# Patient Record
Sex: FEMALE | Race: BLACK OR AFRICAN AMERICAN | NOT HISPANIC OR LATINO | URBAN - METROPOLITAN AREA
[De-identification: names, ages, dates, MRNs, and addresses within clinical notes are randomized per-mention and may not be internally consistent; named-entity substitution may affect disease eponyms.]

---

## 2017-11-25 ENCOUNTER — INPATIENT (INPATIENT)
Facility: HOSPITAL | Age: 62
LOS: 2 days | Discharge: HOME | End: 2017-11-28
Attending: SURGERY | Admitting: SURGERY
Payer: COMMERCIAL

## 2017-11-25 VITALS
HEART RATE: 90 BPM | RESPIRATION RATE: 16 BRPM | OXYGEN SATURATION: 99 % | TEMPERATURE: 97 F | DIASTOLIC BLOOD PRESSURE: 73 MMHG | SYSTOLIC BLOOD PRESSURE: 157 MMHG

## 2017-11-25 DIAGNOSIS — K57.92 DIVERTICULITIS OF INTESTINE, PART UNSPECIFIED, WITHOUT PERFORATION OR ABSCESS WITHOUT BLEEDING: ICD-10-CM

## 2017-11-25 DIAGNOSIS — Z98.84 BARIATRIC SURGERY STATUS: Chronic | ICD-10-CM

## 2017-11-25 LAB
ALBUMIN SERPL ELPH-MCNC: 3.8 G/DL — SIGNIFICANT CHANGE UP (ref 3.3–5)
ALP SERPL-CCNC: 142 U/L — HIGH (ref 40–120)
ALT FLD-CCNC: 21 U/L — SIGNIFICANT CHANGE UP (ref 4–33)
APPEARANCE UR: SIGNIFICANT CHANGE UP
APTT BLD: 31.2 SEC — SIGNIFICANT CHANGE UP (ref 27.5–37.4)
AST SERPL-CCNC: 22 U/L — SIGNIFICANT CHANGE UP (ref 4–32)
BASOPHILS # BLD AUTO: 0.04 K/UL — SIGNIFICANT CHANGE UP (ref 0–0.2)
BASOPHILS NFR BLD AUTO: 0.3 % — SIGNIFICANT CHANGE UP (ref 0–2)
BILIRUB SERPL-MCNC: 0.8 MG/DL — SIGNIFICANT CHANGE UP (ref 0.2–1.2)
BILIRUB UR-MCNC: SIGNIFICANT CHANGE UP
BLD GP AB SCN SERPL QL: NEGATIVE — SIGNIFICANT CHANGE UP
BLOOD UR QL VISUAL: NEGATIVE — SIGNIFICANT CHANGE UP
BUN SERPL-MCNC: 16 MG/DL — SIGNIFICANT CHANGE UP (ref 7–23)
CALCIUM SERPL-MCNC: 9.3 MG/DL — SIGNIFICANT CHANGE UP (ref 8.4–10.5)
CHLORIDE SERPL-SCNC: 101 MMOL/L — SIGNIFICANT CHANGE UP (ref 98–107)
CO2 SERPL-SCNC: 24 MMOL/L — SIGNIFICANT CHANGE UP (ref 22–31)
COLOR SPEC: YELLOW — SIGNIFICANT CHANGE UP
CREAT SERPL-MCNC: 0.9 MG/DL — SIGNIFICANT CHANGE UP (ref 0.5–1.3)
EOSINOPHIL # BLD AUTO: 0.13 K/UL — SIGNIFICANT CHANGE UP (ref 0–0.5)
EOSINOPHIL NFR BLD AUTO: 1 % — SIGNIFICANT CHANGE UP (ref 0–6)
GLUCOSE SERPL-MCNC: 127 MG/DL — HIGH (ref 70–99)
GLUCOSE UR-MCNC: NEGATIVE — SIGNIFICANT CHANGE UP
HBA1C BLD-MCNC: 6.7 % — HIGH (ref 4–5.6)
HCT VFR BLD CALC: 36.3 % — SIGNIFICANT CHANGE UP (ref 34.5–45)
HGB BLD-MCNC: 11.3 G/DL — LOW (ref 11.5–15.5)
IMM GRANULOCYTES # BLD AUTO: 0.09 # — SIGNIFICANT CHANGE UP
IMM GRANULOCYTES NFR BLD AUTO: 0.7 % — SIGNIFICANT CHANGE UP (ref 0–1.5)
INR BLD: 1.26 — HIGH (ref 0.88–1.17)
KETONES UR-MCNC: SIGNIFICANT CHANGE UP
LEUKOCYTE ESTERASE UR-ACNC: HIGH
LYMPHOCYTES # BLD AUTO: 1.94 K/UL — SIGNIFICANT CHANGE UP (ref 1–3.3)
LYMPHOCYTES # BLD AUTO: 14.5 % — SIGNIFICANT CHANGE UP (ref 13–44)
MCHC RBC-ENTMCNC: 28.5 PG — SIGNIFICANT CHANGE UP (ref 27–34)
MCHC RBC-ENTMCNC: 31.1 % — LOW (ref 32–36)
MCV RBC AUTO: 91.4 FL — SIGNIFICANT CHANGE UP (ref 80–100)
MONOCYTES # BLD AUTO: 1.2 K/UL — HIGH (ref 0–0.9)
MONOCYTES NFR BLD AUTO: 8.9 % — SIGNIFICANT CHANGE UP (ref 2–14)
MUCOUS THREADS # UR AUTO: SIGNIFICANT CHANGE UP
NEUTROPHILS # BLD AUTO: 10.02 K/UL — HIGH (ref 1.8–7.4)
NEUTROPHILS NFR BLD AUTO: 74.6 % — SIGNIFICANT CHANGE UP (ref 43–77)
NITRITE UR-MCNC: NEGATIVE — SIGNIFICANT CHANGE UP
NRBC # FLD: 0 — SIGNIFICANT CHANGE UP
PH UR: 6.5 — SIGNIFICANT CHANGE UP (ref 4.6–8)
PLATELET # BLD AUTO: 279 K/UL — SIGNIFICANT CHANGE UP (ref 150–400)
PMV BLD: 11 FL — SIGNIFICANT CHANGE UP (ref 7–13)
POTASSIUM SERPL-MCNC: 3.3 MMOL/L — LOW (ref 3.5–5.3)
POTASSIUM SERPL-SCNC: 3.3 MMOL/L — LOW (ref 3.5–5.3)
PROT SERPL-MCNC: 7.4 G/DL — SIGNIFICANT CHANGE UP (ref 6–8.3)
PROT UR-MCNC: 100 — HIGH
PROTHROM AB SERPL-ACNC: 14.2 SEC — HIGH (ref 9.8–13.1)
RBC # BLD: 3.97 M/UL — SIGNIFICANT CHANGE UP (ref 3.8–5.2)
RBC # FLD: 12.8 % — SIGNIFICANT CHANGE UP (ref 10.3–14.5)
RBC CASTS # UR COMP ASSIST: SIGNIFICANT CHANGE UP (ref 0–?)
RH IG SCN BLD-IMP: POSITIVE — SIGNIFICANT CHANGE UP
SODIUM SERPL-SCNC: 143 MMOL/L — SIGNIFICANT CHANGE UP (ref 135–145)
SP GR SPEC: 1.03 — SIGNIFICANT CHANGE UP (ref 1–1.03)
SQUAMOUS # UR AUTO: SIGNIFICANT CHANGE UP
TSH SERPL-MCNC: 5.21 UIU/ML — HIGH (ref 0.27–4.2)
UROBILINOGEN FLD QL: >8 E.U. — SIGNIFICANT CHANGE UP (ref 0.1–0.2)
WBC # BLD: 13.42 K/UL — HIGH (ref 3.8–10.5)
WBC # FLD AUTO: 13.42 K/UL — HIGH (ref 3.8–10.5)
WBC UR QL: HIGH (ref 0–?)

## 2017-11-25 PROCEDURE — 74177 CT ABD & PELVIS W/CONTRAST: CPT | Mod: 26

## 2017-11-25 PROCEDURE — 99222 1ST HOSP IP/OBS MODERATE 55: CPT

## 2017-11-25 RX ORDER — SODIUM CHLORIDE 9 MG/ML
1000 INJECTION, SOLUTION INTRAVENOUS
Qty: 0 | Refills: 0 | Status: DISCONTINUED | OUTPATIENT
Start: 2017-11-25 | End: 2017-11-26

## 2017-11-25 RX ORDER — VALSARTAN 80 MG/1
320 TABLET ORAL DAILY
Qty: 0 | Refills: 0 | Status: DISCONTINUED | OUTPATIENT
Start: 2017-11-25 | End: 2017-11-28

## 2017-11-25 RX ORDER — MONTELUKAST 4 MG/1
1 TABLET, CHEWABLE ORAL
Qty: 0 | Refills: 0 | COMMUNITY

## 2017-11-25 RX ORDER — DILTIAZEM HCL 120 MG
240 CAPSULE, EXT RELEASE 24 HR ORAL DAILY
Qty: 0 | Refills: 0 | Status: DISCONTINUED | OUTPATIENT
Start: 2017-11-25 | End: 2017-11-28

## 2017-11-25 RX ORDER — ASPIRIN/CALCIUM CARB/MAGNESIUM 324 MG
81 TABLET ORAL DAILY
Qty: 0 | Refills: 0 | Status: DISCONTINUED | OUTPATIENT
Start: 2017-11-25 | End: 2017-11-28

## 2017-11-25 RX ORDER — PIPERACILLIN AND TAZOBACTAM 4; .5 G/20ML; G/20ML
3.38 INJECTION, POWDER, LYOPHILIZED, FOR SOLUTION INTRAVENOUS ONCE
Qty: 0 | Refills: 0 | Status: COMPLETED | OUTPATIENT
Start: 2017-11-25 | End: 2017-11-25

## 2017-11-25 RX ORDER — ENOXAPARIN SODIUM 100 MG/ML
40 INJECTION SUBCUTANEOUS EVERY 24 HOURS
Qty: 0 | Refills: 0 | Status: DISCONTINUED | OUTPATIENT
Start: 2017-11-25 | End: 2017-11-28

## 2017-11-25 RX ORDER — INSULIN LISPRO 100/ML
VIAL (ML) SUBCUTANEOUS EVERY 6 HOURS
Qty: 0 | Refills: 0 | Status: DISCONTINUED | OUTPATIENT
Start: 2017-11-25 | End: 2017-11-28

## 2017-11-25 RX ORDER — METFORMIN HYDROCHLORIDE 850 MG/1
1 TABLET ORAL
Qty: 0 | Refills: 0 | COMMUNITY

## 2017-11-25 RX ORDER — BUDESONIDE AND FORMOTEROL FUMARATE DIHYDRATE 160; 4.5 UG/1; UG/1
2 AEROSOL RESPIRATORY (INHALATION)
Qty: 0 | Refills: 0 | Status: DISCONTINUED | OUTPATIENT
Start: 2017-11-25 | End: 2017-11-28

## 2017-11-25 RX ORDER — TRAMADOL HYDROCHLORIDE 50 MG/1
1 TABLET ORAL
Qty: 0 | Refills: 0 | COMMUNITY

## 2017-11-25 RX ORDER — PIPERACILLIN AND TAZOBACTAM 4; .5 G/20ML; G/20ML
3.38 INJECTION, POWDER, LYOPHILIZED, FOR SOLUTION INTRAVENOUS EVERY 8 HOURS
Qty: 0 | Refills: 0 | Status: DISCONTINUED | OUTPATIENT
Start: 2017-11-25 | End: 2017-11-28

## 2017-11-25 RX ORDER — ALBUTEROL 90 UG/1
2 AEROSOL, METERED ORAL EVERY 6 HOURS
Qty: 0 | Refills: 0 | Status: DISCONTINUED | OUTPATIENT
Start: 2017-11-25 | End: 2017-11-28

## 2017-11-25 RX ORDER — DILTIAZEM HCL 120 MG
1 CAPSULE, EXT RELEASE 24 HR ORAL
Qty: 0 | Refills: 0 | COMMUNITY

## 2017-11-25 RX ORDER — ASPIRIN/CALCIUM CARB/MAGNESIUM 324 MG
1 TABLET ORAL
Qty: 0 | Refills: 0 | COMMUNITY

## 2017-11-25 RX ORDER — MONTELUKAST 4 MG/1
10 TABLET, CHEWABLE ORAL DAILY
Qty: 0 | Refills: 0 | Status: DISCONTINUED | OUTPATIENT
Start: 2017-11-25 | End: 2017-11-28

## 2017-11-25 RX ORDER — SODIUM CHLORIDE 9 MG/ML
1000 INJECTION INTRAMUSCULAR; INTRAVENOUS; SUBCUTANEOUS ONCE
Qty: 0 | Refills: 0 | Status: COMPLETED | OUTPATIENT
Start: 2017-11-25 | End: 2017-11-25

## 2017-11-25 RX ORDER — FLUTICASONE PROPIONATE AND SALMETEROL 50; 250 UG/1; UG/1
2 POWDER ORAL; RESPIRATORY (INHALATION)
Qty: 0 | Refills: 0 | COMMUNITY

## 2017-11-25 RX ORDER — ALBUTEROL 90 UG/1
3 AEROSOL, METERED ORAL
Qty: 0 | Refills: 0 | COMMUNITY

## 2017-11-25 RX ORDER — LEVOTHYROXINE SODIUM 125 MCG
1 TABLET ORAL
Qty: 0 | Refills: 0 | COMMUNITY

## 2017-11-25 RX ORDER — LEVOTHYROXINE SODIUM 125 MCG
75 TABLET ORAL DAILY
Qty: 0 | Refills: 0 | Status: DISCONTINUED | OUTPATIENT
Start: 2017-11-25 | End: 2017-11-28

## 2017-11-25 RX ADMIN — PIPERACILLIN AND TAZOBACTAM 200 GRAM(S): 4; .5 INJECTION, POWDER, LYOPHILIZED, FOR SOLUTION INTRAVENOUS at 20:59

## 2017-11-25 RX ADMIN — SODIUM CHLORIDE 1000 MILLILITER(S): 9 INJECTION INTRAMUSCULAR; INTRAVENOUS; SUBCUTANEOUS at 17:33

## 2017-11-25 NOTE — H&P ADULT - NSHPPHYSICALEXAM_GEN_ALL_CORE
T(C): 36.5 (11-25-17 @ 21:02), Max: 36.5 (11-25-17 @ 21:02)  HR: 80 (11-25-17 @ 21:02) (80 - 90)  BP: 129/58 (11-25-17 @ 21:02) (129/58 - 157/73)  RR: 18 (11-25-17 @ 21:02) (16 - 18)  SpO2: 97% (11-25-17 @ 21:02) (97% - 99%)  POCT Blood Glucose.: 101 mg/dL (25 Nov 2017 15:25)    General: NAD, Lying in bed  Neuro: A+Ox3, no focal deficits  HEENT: NC/AT, EOMI  Cardio: RRR  Resp: Good effort bilaterally  GI/Abd: Soft, obese, non-distended, tender in LLQ with focal guarding, no masses palpated  Vascular: All 4 extremities warm  Skin: Intact, no breakdown  Musculoskeletal: All 4 extremities moving spontaneously

## 2017-11-25 NOTE — ED PROVIDER NOTE - MEDICAL DECISION MAKING DETAILS
62 y.o female pmhx of DM , HTN, HLD, hypothyroidism, urinary incontinence, L side pain x 2 years, Lap band procedure 6 years ago no complications, here with increased urinary frequency and urge to urinate and L flank pain radiating to L groin since Wednesday. will obtain labs, ct abdomen and pelvis for LLQ tenderness, ua/ucx, denying pain medication at this time.

## 2017-11-25 NOTE — ED PROVIDER NOTE - PROGRESS NOTE DETAILS
darwin english: spoke to radiology, localized perforated sigmoid diverticulotics , thickening of appendix ? early appy- pt stable, discussed with patient and paged surgery darwin english: spoke to radiology, localized perforated sigmoid diverticulotics , thickening of appendix ? early appy- pt stable, discussed with patient and paged surgery. pt still denying any pain medication. stable darwin english: spoke to surgery- since pt had lap band needs to see bariatric surgeon Dr. Cunningham, awaiting call back , pt will need to be transferred, discussed with patient, agreeable to plan. darwin english: spoke to surgery, pt does not have to be transferred and can be admitted to [facility]

## 2017-11-25 NOTE — ED PROVIDER NOTE - OBJECTIVE STATEMENT
62 y.o female pmhx of DM , HTN, HLD, hypothyroidism, urinary incontinence, L side pain x 2 years, Lap band procedure 6 years ago no complications, seen in ED prior for same complaint coming in with increased urinary frequency and urge to urinate and L flank pain radiating to L groin since Wednesday.  Pain is worse with movement and palpation.  Admits to increased incontinence for the past 2 years increasing size of diaper/pads - was told by GYN to see a urogyn but has not made an appt yet. Has not tried anything for the pain. Denies fevers, chills, headache, chest pain, SOB, cough, n/v/d, numbness, tingling, weakness, hematuria.

## 2017-11-25 NOTE — ED ADULT NURSE NOTE - OBJECTIVE STATEMENT
pt c.o. urinary incontinence since wed. states she had a strange feeling in her vaginal area. denies fevers, dysuria. states she thinks she has a prolapse in her bladder. sl placed labs sent. ns infusing. awaiting ct.

## 2017-11-25 NOTE — ED PROVIDER NOTE - PMH
Asthma    Asthma    Diabetes    DM (Diabetes Mellitus)    Heart Murmur    Hypercholesterolemia    Hypertension    Hypertension    Hypothyroid    Hypothyroidism    Lumbar Disc Disorder    Obese    CLARI (Obstructive Sleep Apnea)

## 2017-11-25 NOTE — H&P ADULT - NSHPLABSRESULTS_GEN_ALL_CORE
CBC (11-25 @ 16:59)                              11.3<L>                         13.42<H>  )----------------(  279        74.6  % Neutrophils, 14.5  % Lymphocytes, ANC: 10.02<H>                              36.3      BMP (11-25 @ 16:59)             143     |  101     |  16    		Ca++ --      Ca 9.3                ---------------------------------( 127<H>		Mg --                 3.3<L>  |  24      |  0.90  			Ph --        LFTs (11-25 @ 16:59)      TPro 7.4 / Alb 3.8 / TBili 0.8 / DBili -- / AST 22 / ALT 21 / AlkPhos 142<H>    Coags (11-25 @ 20:45)  aPTT 31.2 / INR 1.26<H> / PT 14.2<H>      ---------------------------------------------------------------------------------------------      IMAGING    < from: CT Abdomen and Pelvis w/ Oral Cont and w/ IV Cont (11.25.17 @ 19:24) >    IMPRESSION:     Acute sigmoid diverticulitis complicated by localized extraluminal free   air. Extensive inflammatory change in the suprapubic region and reactive   thickening of small bowel loops suggests localized peritonitis. No   discrete focal drainable collection.     The appendix appears dilated and fluid-filled, indeterminate.  The   appendix was normal on a previous CT of 2015.  Correlate clinically for   early acute appendicitis.     Indeterminate 4.1 cm exophytic liver mass as described above, similar   appearance to November 2015. Favor hemangioma as it has not grown only in   the past 2 years. Neoplasm cannot be excluded and MRI may be obtained for   further evaluation if clinically indicated.    < end of copied text >

## 2017-11-25 NOTE — ED ADULT TRIAGE NOTE - CHIEF COMPLAINT QUOTE
pt c/o bladder pain and urinary incontinence since Wednesday, states that urine became dark today.  PMH: asthma, hypothyroid, HTN, DM

## 2017-11-25 NOTE — H&P ADULT - ASSESSMENT
ASSESSMENT: Patient is a 62y old f with acute sigmoid diverticulitis, with surrounding inflammation without abscess.      PLAN:   - Admit to general surgery, Dr. Azul  - No acute surgical intervention at this time  - NPO  - IV Fluids  - IV Antibiotics, c/w Zosyn  - c/w Home Meds  - Serial Abdominal Exams.   - Patient and plan discussed with Attending, Dr. Azul.     Kannan Sanchez MD PGY3  B Team Surgery, #[id number]

## 2017-11-25 NOTE — ED PROVIDER NOTE - ATTENDING CONTRIBUTION TO CARE
Magali núñez- 61 y/o F with h/o dm, baseline mild urine incontinence, asthma, obesity, htn p/w left sdied abd pain and feels than something sinked into her pelvis and has increased pain when she has urge to urinate, No fever, chills,  nausea, vomiting, diarrhea or constipation. Pt has no prior h/o kidney stones. Pt lives with  at home     pt is alert, well appearing obese female, s1s2 normal reg, b/l clear breath sounds, abd soft obese noted ventral hernia and tenderness to palpation in llq , normal bowel sounds, no cvat b/l, pelvic exam showed no prolapse on inspection, changes on thighs from chronic urine incontinence,- skin darkening noted, skin warm dry, good turgor    likely neurogenic blader, from diabetes, will r/o uti , will check labs, also ct abd to r/o maliganancy vs kidney stone vs colitis

## 2017-11-25 NOTE — H&P ADULT - HISTORY OF PRESENT ILLNESS
Patient is a 62y old Female who presents with a chief complaint of lower abdominal pain.  Patient states that since Wednesday, she has had severe lower abdominal pain, that has been worse when she moves.  She has had mild abdominal pain intermittently for the past 2 years, but this current episode was much more severe and it felt slightly deeper than her chronic pain.  Her pain is worst on the left lower abdomen.  She was initially able to cool on Thursday but did not eat anything, she has had very limited PO intake since Wednesday.  She has never had previous episodes of pain like this.  Her pain is tolerable when she lays down and doesn't move.  She had chills when her symptoms started on Wednesday, but denies fevers.  She denies lightheadedness/dizziness, denies SOB/chest pain, denies nausea/vomiting, denies constipation/diarrhea.  Her last BM was yesterday morning.      Past surgical history significant for a lap band in 2011.  Patient has not had bariatric surgery follow up in many years, and has not lost weight since the surgery.  Her last colonoscopy was also in 2011, and per the patient, was reportedly normal.

## 2017-11-25 NOTE — ED PROVIDER NOTE - INPATIENT RESIDENT/ACP NOTIFIED DATE
Alprazolam called to [facility] as authorized.  Patient was notified that Dr. Schilling wants to keep her on this dose instead of going back up.  Verbalizes understanding.   25-Nov-2017 21:45

## 2017-11-25 NOTE — H&P ADULT - ATTENDING COMMENTS
Patient presents with acute diverticulitis with localized perforation     a.  Admit to surgery/anand byers  b.  Nil per os  c.  Continue IVF resuscitation  d.  Agree with IV zosyn  e.  CT reviewed

## 2017-11-26 LAB
BUN SERPL-MCNC: 13 MG/DL — SIGNIFICANT CHANGE UP (ref 7–23)
CALCIUM SERPL-MCNC: 8.7 MG/DL — SIGNIFICANT CHANGE UP (ref 8.4–10.5)
CHLORIDE SERPL-SCNC: 104 MMOL/L — SIGNIFICANT CHANGE UP (ref 98–107)
CO2 SERPL-SCNC: 24 MMOL/L — SIGNIFICANT CHANGE UP (ref 22–31)
CREAT SERPL-MCNC: 0.77 MG/DL — SIGNIFICANT CHANGE UP (ref 0.5–1.3)
GLUCOSE SERPL-MCNC: 100 MG/DL — HIGH (ref 70–99)
HCT VFR BLD CALC: 30.7 % — LOW (ref 34.5–45)
HGB BLD-MCNC: 9.9 G/DL — LOW (ref 11.5–15.5)
MAGNESIUM SERPL-MCNC: 1.9 MG/DL — SIGNIFICANT CHANGE UP (ref 1.6–2.6)
MCHC RBC-ENTMCNC: 28.8 PG — SIGNIFICANT CHANGE UP (ref 27–34)
MCHC RBC-ENTMCNC: 32.2 % — SIGNIFICANT CHANGE UP (ref 32–36)
MCV RBC AUTO: 89.2 FL — SIGNIFICANT CHANGE UP (ref 80–100)
NRBC # FLD: 0 — SIGNIFICANT CHANGE UP
PHOSPHATE SERPL-MCNC: 3.1 MG/DL — SIGNIFICANT CHANGE UP (ref 2.5–4.5)
PLATELET # BLD AUTO: 256 K/UL — SIGNIFICANT CHANGE UP (ref 150–400)
PMV BLD: 10.4 FL — SIGNIFICANT CHANGE UP (ref 7–13)
POTASSIUM SERPL-MCNC: 3.1 MMOL/L — LOW (ref 3.5–5.3)
POTASSIUM SERPL-SCNC: 3.1 MMOL/L — LOW (ref 3.5–5.3)
RBC # BLD: 3.44 M/UL — LOW (ref 3.8–5.2)
RBC # FLD: 13 % — SIGNIFICANT CHANGE UP (ref 10.3–14.5)
SODIUM SERPL-SCNC: 144 MMOL/L — SIGNIFICANT CHANGE UP (ref 135–145)
WBC # BLD: 10.83 K/UL — HIGH (ref 3.8–10.5)
WBC # FLD AUTO: 10.83 K/UL — HIGH (ref 3.8–10.5)

## 2017-11-26 PROCEDURE — 99233 SBSQ HOSP IP/OBS HIGH 50: CPT

## 2017-11-26 RX ORDER — ACETAMINOPHEN 500 MG
1000 TABLET ORAL ONCE
Qty: 0 | Refills: 0 | Status: COMPLETED | OUTPATIENT
Start: 2017-11-26 | End: 2017-11-26

## 2017-11-26 RX ORDER — SODIUM CHLORIDE 9 MG/ML
1000 INJECTION INTRAMUSCULAR; INTRAVENOUS; SUBCUTANEOUS
Qty: 0 | Refills: 0 | Status: DISCONTINUED | OUTPATIENT
Start: 2017-11-26 | End: 2017-11-26

## 2017-11-26 RX ORDER — POTASSIUM CHLORIDE 20 MEQ
10 PACKET (EA) ORAL
Qty: 0 | Refills: 0 | Status: COMPLETED | OUTPATIENT
Start: 2017-11-26 | End: 2017-11-26

## 2017-11-26 RX ORDER — DEXTROSE MONOHYDRATE, SODIUM CHLORIDE, AND POTASSIUM CHLORIDE 50; .745; 4.5 G/1000ML; G/1000ML; G/1000ML
1000 INJECTION, SOLUTION INTRAVENOUS
Qty: 0 | Refills: 0 | Status: DISCONTINUED | OUTPATIENT
Start: 2017-11-26 | End: 2017-11-28

## 2017-11-26 RX ORDER — SODIUM CHLORIDE 9 MG/ML
1000 INJECTION INTRAMUSCULAR; INTRAVENOUS; SUBCUTANEOUS ONCE
Qty: 0 | Refills: 0 | Status: COMPLETED | OUTPATIENT
Start: 2017-11-26 | End: 2017-11-26

## 2017-11-26 RX ADMIN — SODIUM CHLORIDE 1000 MILLILITER(S): 9 INJECTION INTRAMUSCULAR; INTRAVENOUS; SUBCUTANEOUS at 21:50

## 2017-11-26 RX ADMIN — Medication 400 MILLIGRAM(S): at 17:10

## 2017-11-26 RX ADMIN — Medication 75 MICROGRAM(S): at 05:40

## 2017-11-26 RX ADMIN — BUDESONIDE AND FORMOTEROL FUMARATE DIHYDRATE 2 PUFF(S): 160; 4.5 AEROSOL RESPIRATORY (INHALATION) at 21:50

## 2017-11-26 RX ADMIN — VALSARTAN 320 MILLIGRAM(S): 80 TABLET ORAL at 05:41

## 2017-11-26 RX ADMIN — Medication 100 MILLIEQUIVALENT(S): at 21:49

## 2017-11-26 RX ADMIN — DEXTROSE MONOHYDRATE, SODIUM CHLORIDE, AND POTASSIUM CHLORIDE 150 MILLILITER(S): 50; .745; 4.5 INJECTION, SOLUTION INTRAVENOUS at 15:54

## 2017-11-26 RX ADMIN — ENOXAPARIN SODIUM 40 MILLIGRAM(S): 100 INJECTION SUBCUTANEOUS at 05:40

## 2017-11-26 RX ADMIN — SODIUM CHLORIDE 150 MILLILITER(S): 9 INJECTION INTRAMUSCULAR; INTRAVENOUS; SUBCUTANEOUS at 05:40

## 2017-11-26 RX ADMIN — Medication 81 MILLIGRAM(S): at 13:44

## 2017-11-26 RX ADMIN — BUDESONIDE AND FORMOTEROL FUMARATE DIHYDRATE 2 PUFF(S): 160; 4.5 AEROSOL RESPIRATORY (INHALATION) at 10:37

## 2017-11-26 RX ADMIN — PIPERACILLIN AND TAZOBACTAM 25 GRAM(S): 4; .5 INJECTION, POWDER, LYOPHILIZED, FOR SOLUTION INTRAVENOUS at 05:40

## 2017-11-26 RX ADMIN — Medication 100 MILLIEQUIVALENT(S): at 23:51

## 2017-11-26 RX ADMIN — PIPERACILLIN AND TAZOBACTAM 25 GRAM(S): 4; .5 INJECTION, POWDER, LYOPHILIZED, FOR SOLUTION INTRAVENOUS at 15:54

## 2017-11-26 RX ADMIN — Medication 1000 MILLIGRAM(S): at 17:35

## 2017-11-26 RX ADMIN — MONTELUKAST 10 MILLIGRAM(S): 4 TABLET, CHEWABLE ORAL at 13:44

## 2017-11-26 RX ADMIN — Medication 240 MILLIGRAM(S): at 05:41

## 2017-11-26 NOTE — PROGRESS NOTE ADULT - ATTENDING COMMENTS
Patient seen and examined case discussed  with b team residents at bedside.  Chart and note reviewed    Patient with LLQ pain and tenderness. She denies loose or bloody  bowel movements.    Acute diverticulitis  a. NPO  b.  continue ivf and IV zosyn  c.  Bowel rest  d.  DVT prophylaxis with lovenox  e.  Discuss diagnosis, management and prognosis of her current condition

## 2017-11-27 LAB
BACTERIA UR CULT: SIGNIFICANT CHANGE UP
BUN SERPL-MCNC: 10 MG/DL — SIGNIFICANT CHANGE UP (ref 7–23)
CALCIUM SERPL-MCNC: 8.5 MG/DL — SIGNIFICANT CHANGE UP (ref 8.4–10.5)
CHLORIDE SERPL-SCNC: 103 MMOL/L — SIGNIFICANT CHANGE UP (ref 98–107)
CO2 SERPL-SCNC: 24 MMOL/L — SIGNIFICANT CHANGE UP (ref 22–31)
CREAT SERPL-MCNC: 0.74 MG/DL — SIGNIFICANT CHANGE UP (ref 0.5–1.3)
GLUCOSE SERPL-MCNC: 155 MG/DL — HIGH (ref 70–99)
HCT VFR BLD CALC: 32.3 % — LOW (ref 34.5–45)
HGB BLD-MCNC: 10.1 G/DL — LOW (ref 11.5–15.5)
MAGNESIUM SERPL-MCNC: 1.8 MG/DL — SIGNIFICANT CHANGE UP (ref 1.6–2.6)
MCHC RBC-ENTMCNC: 28.5 PG — SIGNIFICANT CHANGE UP (ref 27–34)
MCHC RBC-ENTMCNC: 31.3 % — LOW (ref 32–36)
MCV RBC AUTO: 91.2 FL — SIGNIFICANT CHANGE UP (ref 80–100)
NRBC # FLD: 0 — SIGNIFICANT CHANGE UP
PHOSPHATE SERPL-MCNC: 2.7 MG/DL — SIGNIFICANT CHANGE UP (ref 2.5–4.5)
PLATELET # BLD AUTO: 279 K/UL — SIGNIFICANT CHANGE UP (ref 150–400)
PMV BLD: 10.3 FL — SIGNIFICANT CHANGE UP (ref 7–13)
POTASSIUM SERPL-MCNC: 3.7 MMOL/L — SIGNIFICANT CHANGE UP (ref 3.5–5.3)
POTASSIUM SERPL-SCNC: 3.7 MMOL/L — SIGNIFICANT CHANGE UP (ref 3.5–5.3)
RBC # BLD: 3.54 M/UL — LOW (ref 3.8–5.2)
RBC # FLD: 12.8 % — SIGNIFICANT CHANGE UP (ref 10.3–14.5)
SODIUM SERPL-SCNC: 140 MMOL/L — SIGNIFICANT CHANGE UP (ref 135–145)
SPECIMEN SOURCE: SIGNIFICANT CHANGE UP
WBC # BLD: 7.77 K/UL — SIGNIFICANT CHANGE UP (ref 3.8–10.5)
WBC # FLD AUTO: 7.77 K/UL — SIGNIFICANT CHANGE UP (ref 3.8–10.5)

## 2017-11-27 RX ORDER — ACETAMINOPHEN 500 MG
650 TABLET ORAL EVERY 6 HOURS
Qty: 0 | Refills: 0 | Status: DISCONTINUED | OUTPATIENT
Start: 2017-11-27 | End: 2017-11-28

## 2017-11-27 RX ORDER — POTASSIUM PHOSPHATE, MONOBASIC POTASSIUM PHOSPHATE, DIBASIC 236; 224 MG/ML; MG/ML
15 INJECTION, SOLUTION INTRAVENOUS ONCE
Qty: 0 | Refills: 0 | Status: COMPLETED | OUTPATIENT
Start: 2017-11-27 | End: 2017-11-27

## 2017-11-27 RX ORDER — MAGNESIUM SULFATE 500 MG/ML
2 VIAL (ML) INJECTION ONCE
Qty: 0 | Refills: 0 | Status: COMPLETED | OUTPATIENT
Start: 2017-11-27 | End: 2017-11-27

## 2017-11-27 RX ADMIN — Medication 50 GRAM(S): at 10:24

## 2017-11-27 RX ADMIN — PIPERACILLIN AND TAZOBACTAM 25 GRAM(S): 4; .5 INJECTION, POWDER, LYOPHILIZED, FOR SOLUTION INTRAVENOUS at 10:35

## 2017-11-27 RX ADMIN — Medication 75 MICROGRAM(S): at 05:20

## 2017-11-27 RX ADMIN — Medication 650 MILLIGRAM(S): at 10:24

## 2017-11-27 RX ADMIN — DEXTROSE MONOHYDRATE, SODIUM CHLORIDE, AND POTASSIUM CHLORIDE 150 MILLILITER(S): 50; .745; 4.5 INJECTION, SOLUTION INTRAVENOUS at 10:24

## 2017-11-27 RX ADMIN — Medication 100 MILLIEQUIVALENT(S): at 01:10

## 2017-11-27 RX ADMIN — PIPERACILLIN AND TAZOBACTAM 25 GRAM(S): 4; .5 INJECTION, POWDER, LYOPHILIZED, FOR SOLUTION INTRAVENOUS at 17:23

## 2017-11-27 RX ADMIN — Medication 2: at 06:16

## 2017-11-27 RX ADMIN — POTASSIUM PHOSPHATE, MONOBASIC POTASSIUM PHOSPHATE, DIBASIC 62.5 MILLIMOLE(S): 236; 224 INJECTION, SOLUTION INTRAVENOUS at 12:18

## 2017-11-27 RX ADMIN — DEXTROSE MONOHYDRATE, SODIUM CHLORIDE, AND POTASSIUM CHLORIDE 150 MILLILITER(S): 50; .745; 4.5 INJECTION, SOLUTION INTRAVENOUS at 05:20

## 2017-11-27 RX ADMIN — PIPERACILLIN AND TAZOBACTAM 25 GRAM(S): 4; .5 INJECTION, POWDER, LYOPHILIZED, FOR SOLUTION INTRAVENOUS at 02:36

## 2017-11-27 RX ADMIN — BUDESONIDE AND FORMOTEROL FUMARATE DIHYDRATE 2 PUFF(S): 160; 4.5 AEROSOL RESPIRATORY (INHALATION) at 10:24

## 2017-11-27 RX ADMIN — Medication 650 MILLIGRAM(S): at 17:49

## 2017-11-27 RX ADMIN — MONTELUKAST 10 MILLIGRAM(S): 4 TABLET, CHEWABLE ORAL at 12:18

## 2017-11-27 RX ADMIN — Medication 81 MILLIGRAM(S): at 12:18

## 2017-11-27 RX ADMIN — VALSARTAN 320 MILLIGRAM(S): 80 TABLET ORAL at 05:20

## 2017-11-27 RX ADMIN — ENOXAPARIN SODIUM 40 MILLIGRAM(S): 100 INJECTION SUBCUTANEOUS at 05:20

## 2017-11-27 RX ADMIN — DEXTROSE MONOHYDRATE, SODIUM CHLORIDE, AND POTASSIUM CHLORIDE 75 MILLILITER(S): 50; .745; 4.5 INJECTION, SOLUTION INTRAVENOUS at 17:23

## 2017-11-27 NOTE — PROGRESS NOTE ADULT - SUBJECTIVE AND OBJECTIVE BOX
B Team (General Surgery) Daily Progress Note    SUBJECTIVE:  Pt seen and examined, and is resting comfortably in bed. No acute events overnight. Pain is adequately controlled on current regimen. Pt has no complaints at this time. Negative for flatus and BM.     OBJECTIVE:  Vital Signs Last 24 Hrs  T(C): 37.1 (27 Nov 2017 05:18), Max: 37.1 (27 Nov 2017 05:18)  T(F): 98.8 (27 Nov 2017 05:18), Max: 98.8 (27 Nov 2017 05:18)  HR: 64 (27 Nov 2017 05:18) (64 - 72)  BP: 139/67 (27 Nov 2017 05:18) (118/55 - 139/67)  BP(mean): --  RR: 17 (27 Nov 2017 05:18) (17 - 19)  SpO2: 99% (27 Nov 2017 05:18) (97% - 100%)    I&O's Detail    26 Nov 2017 07:01  -  27 Nov 2017 07:00  --------------------------------------------------------  IN:    dextrose 5% + sodium chloride 0.45% with potassium chloride 20 mEq/L: 1200 mL    IV PiggyBack: 400 mL  Total IN: 1600 mL    OUT:    Voided: 1275 mL  Total OUT: 1275 mL    Total NET: 325 mL      Exam:  GEN: A&Ox3, NAD  HEENT: atraumatic, normocephalic  CV: no JVD  RESP: no increased work of breathing, no use of accessory muscles  GI/ABD: soft, appropriately tender in LLQ, non-distended, no rebound or guarding  : deferred  EXTREMITIES: warm, pink, well-perfused                        10.1   7.77  )-----------( 279      ( 27 Nov 2017 06:15 )             32.3       11-27    140  |  103  |  10  ----------------------------<  155<H>  3.7   |  24  |  0.74    Ca    8.5      27 Nov 2017 06:15  Phos  2.7     11-27  Mg     1.8     11-27    TPro  7.4  /  Alb  3.8  /  TBili  0.8  /  DBili  x   /  AST  22  /  ALT  21  /  AlkPhos  142<H>  11-25      PT/INR - ( 25 Nov 2017 20:45 )   PT: 14.2 SEC;   INR: 1.26          PTT - ( 25 Nov 2017 20:45 )  PTT:31.2 SEC    ASSESSMENT: 62yFemale with acute sigmoid diverticulitis no abscess    PLAN:    - Diet: NPO/IVF  - Continue Zosyn  - Monitor GI function  - Activity: OOB/ambulation  - Incentive spirometry  - DVT prophylaxis  - Dispo: admitted to B-team surgery        Zhou Shah MD PGY-1  pager: [phone]

## 2017-11-27 NOTE — PROGRESS NOTE ADULT - ATTENDING COMMENTS
I have personally interviewed and examined this patient, reviewed pertinent labs and imaging, and discussed the case with colleagues, residents, and physician assistants on B Team rounds.    The active care issues are:  1. acute diverticulitis  2. at risk for malnutrition  3. obesity    Plan  trial of clear liquid diet  continue antibiotics  ambulate  incentive spirometry

## 2017-11-28 ENCOUNTER — TRANSCRIPTION ENCOUNTER (OUTPATIENT)
Age: 62
End: 2017-11-28

## 2017-11-28 VITALS
RESPIRATION RATE: 20 BRPM | OXYGEN SATURATION: 94 % | DIASTOLIC BLOOD PRESSURE: 72 MMHG | SYSTOLIC BLOOD PRESSURE: 148 MMHG | HEART RATE: 82 BPM | TEMPERATURE: 98 F

## 2017-11-28 LAB
BUN SERPL-MCNC: 7 MG/DL — SIGNIFICANT CHANGE UP (ref 7–23)
CALCIUM SERPL-MCNC: 8.7 MG/DL — SIGNIFICANT CHANGE UP (ref 8.4–10.5)
CHLORIDE SERPL-SCNC: 106 MMOL/L — SIGNIFICANT CHANGE UP (ref 98–107)
CO2 SERPL-SCNC: 23 MMOL/L — SIGNIFICANT CHANGE UP (ref 22–31)
CREAT SERPL-MCNC: 0.8 MG/DL — SIGNIFICANT CHANGE UP (ref 0.5–1.3)
GLUCOSE SERPL-MCNC: 123 MG/DL — HIGH (ref 70–99)
MAGNESIUM SERPL-MCNC: 1.8 MG/DL — SIGNIFICANT CHANGE UP (ref 1.6–2.6)
PHOSPHATE SERPL-MCNC: 2.7 MG/DL — SIGNIFICANT CHANGE UP (ref 2.5–4.5)
POTASSIUM SERPL-MCNC: 3.8 MMOL/L — SIGNIFICANT CHANGE UP (ref 3.5–5.3)
POTASSIUM SERPL-SCNC: 3.8 MMOL/L — SIGNIFICANT CHANGE UP (ref 3.5–5.3)
SODIUM SERPL-SCNC: 144 MMOL/L — SIGNIFICANT CHANGE UP (ref 135–145)

## 2017-11-28 RX ORDER — INSULIN LISPRO 100/ML
VIAL (ML) SUBCUTANEOUS
Qty: 0 | Refills: 0 | Status: DISCONTINUED | OUTPATIENT
Start: 2017-11-28 | End: 2017-11-28

## 2017-11-28 RX ORDER — ACETAMINOPHEN 500 MG
2 TABLET ORAL
Qty: 0 | Refills: 0 | COMMUNITY
Start: 2017-11-28

## 2017-11-28 RX ORDER — POTASSIUM CHLORIDE 20 MEQ
20 PACKET (EA) ORAL ONCE
Qty: 0 | Refills: 0 | Status: COMPLETED | OUTPATIENT
Start: 2017-11-28 | End: 2017-11-28

## 2017-11-28 RX ADMIN — Medication 81 MILLIGRAM(S): at 11:08

## 2017-11-28 RX ADMIN — Medication 20 MILLIEQUIVALENT(S): at 11:08

## 2017-11-28 RX ADMIN — MONTELUKAST 10 MILLIGRAM(S): 4 TABLET, CHEWABLE ORAL at 11:08

## 2017-11-28 RX ADMIN — Medication 650 MILLIGRAM(S): at 17:39

## 2017-11-28 RX ADMIN — BUDESONIDE AND FORMOTEROL FUMARATE DIHYDRATE 2 PUFF(S): 160; 4.5 AEROSOL RESPIRATORY (INHALATION) at 11:08

## 2017-11-28 RX ADMIN — PIPERACILLIN AND TAZOBACTAM 25 GRAM(S): 4; .5 INJECTION, POWDER, LYOPHILIZED, FOR SOLUTION INTRAVENOUS at 11:08

## 2017-11-28 RX ADMIN — PIPERACILLIN AND TAZOBACTAM 25 GRAM(S): 4; .5 INJECTION, POWDER, LYOPHILIZED, FOR SOLUTION INTRAVENOUS at 01:00

## 2017-11-28 RX ADMIN — VALSARTAN 320 MILLIGRAM(S): 80 TABLET ORAL at 05:45

## 2017-11-28 RX ADMIN — ENOXAPARIN SODIUM 40 MILLIGRAM(S): 100 INJECTION SUBCUTANEOUS at 05:45

## 2017-11-28 RX ADMIN — Medication 240 MILLIGRAM(S): at 05:45

## 2017-11-28 RX ADMIN — DEXTROSE MONOHYDRATE, SODIUM CHLORIDE, AND POTASSIUM CHLORIDE 75 MILLILITER(S): 50; .745; 4.5 INJECTION, SOLUTION INTRAVENOUS at 05:45

## 2017-11-28 RX ADMIN — Medication 75 MICROGRAM(S): at 05:45

## 2017-11-28 RX ADMIN — DEXTROSE MONOHYDRATE, SODIUM CHLORIDE, AND POTASSIUM CHLORIDE 30 MILLILITER(S): 50; .745; 4.5 INJECTION, SOLUTION INTRAVENOUS at 11:41

## 2017-11-28 RX ADMIN — Medication 650 MILLIGRAM(S): at 05:54

## 2017-11-28 NOTE — DISCHARGE NOTE ADULT - PLAN OF CARE
Recovery from diverticulitis flare ACTIVITY: No heavy lifting or straining. Otherwise, you may return to your usual level of physical activity.   DIET: You may resume your regular diet.  NOTIFY YOUR SURGEON IF: You have a fever (over 100.4 F) or chills, persistent nausea/vomiting, persistent diarrhea, or if your pain is not controlled on your discharge pain medications.  FOLLOW-UP: Please follow up with your primary care physician in week regarding your hospitalization.

## 2017-11-28 NOTE — DISCHARGE NOTE ADULT - MEDICATION SUMMARY - MEDICATIONS TO TAKE
I will START or STAY ON the medications listed below when I get home from the hospital:    traMADol 50 mg oral tablet  -- 1 tab(s) by mouth once a day, As Needed - for moderate pain  -- Indication: For Moderate to severe pain    aspirin 81 mg oral tablet  -- 1 tab(s) by mouth once a day  -- Indication: For CASD    acetaminophen 325 mg oral tablet  -- 2 tab(s) by mouth every 6 hours, As needed, Mild to moderate pain  -- Indication: For Mild pain    Cardizem  mg/24 hours oral capsule, extended release  -- 1 cap(s) by mouth once a day  -- Indication: For HTN    metFORMIN 500 mg oral tablet  -- 1 tab(s) by mouth 2 times a day  -- Indication: For Diabetes    Diovan  mg-25 mg oral tablet  -- 1 tab(s) by mouth once a day  -- Indication: For HTN    Advair  mcg-21 mcg/inh inhalation aerosol  -- 2 puff(s) inhaled 2 times a day  -- Indication: For Asthma    albuterol 2.5 mg/3 mL (0.083%) inhalation solution  -- 3 milliliter(s) inhaled every 6 hours, As Needed  -- Indication: For Asthma    Singulair 10 mg oral tablet  -- 1 tab(s) by mouth once a day  -- Indication: For Asthma/allergies    Synthroid 75 mcg (0.075 mg) oral tablet  -- 1 tab(s) by mouth once a day  -- Indication: For Hypothyroidism I will START or STAY ON the medications listed below when I get home from the hospital:    traMADol 50 mg oral tablet  -- 1 tab(s) by mouth once a day, As Needed - for moderate pain  -- Indication: For MODERATE TO SEVERE PAIN    aspirin 81 mg oral tablet  -- 1 tab(s) by mouth once a day  -- Indication: For CAD    acetaminophen 325 mg oral tablet  -- 2 tab(s) by mouth every 6 hours, As needed, Mild to moderate pain  -- Indication: For MILD PAIN    Cardizem  mg/24 hours oral capsule, extended release  -- 1 cap(s) by mouth once a day  -- Indication: For HTN    metFORMIN 500 mg oral tablet  -- 1 tab(s) by mouth 2 times a day  -- Indication: For DM    Diovan  mg-25 mg oral tablet  -- 1 tab(s) by mouth once a day  -- Indication: For HTN    Advair  mcg-21 mcg/inh inhalation aerosol  -- 2 puff(s) inhaled 2 times a day  -- Indication: For ASTHMA    albuterol 2.5 mg/3 mL (0.083%) inhalation solution  -- 3 milliliter(s) inhaled every 6 hours, As Needed  -- Indication: For ASTHMA    Singulair 10 mg oral tablet  -- 1 tab(s) by mouth once a day  -- Indication: For ASTHMA/ALLERGIES    Synthroid 75 mcg (0.075 mg) oral tablet  -- 1 tab(s) by mouth once a day  -- Indication: For HYPOTHYROIDISM

## 2017-11-28 NOTE — PROGRESS NOTE ADULT - SUBJECTIVE AND OBJECTIVE BOX
B Team (General Surgery) Daily Progress Note    SUBJECTIVE:  Pt seen and examined, and is resting comfortably in bed. She is passing flatus and BMs.  Pain is well controlled.  She is tolerating clears with no N/V.    OBJECTIVE:  T(C): 36.6 (11-28-17 @ 05:43), Max: 37 (11-27-17 @ 21:09)  HR: 74 (11-28-17 @ 05:43) (67 - 74)  BP: 159/86 (11-28-17 @ 05:43) (132/77 - 159/86)  RR: 20 (11-28-17 @ 05:43) (19 - 22)  SpO2: 97% (11-28-17 @ 05:43) (97% - 99%)  Wt(kg): --    11-27 @ 07:01  -  11-28 @ 07:00  --------------------------------------------------------  IN:    dextrose 5% + sodium chloride 0.45% with potassium chloride 20 mEq/L: 2250 mL    IV PiggyBack: 500 mL  Total IN: 2750 mL    OUT:    Voided: 600 mL  Total OUT: 600 mL    Total NET: 2150 mL      Exam:  GEN: A&Ox3, NAD  RESP: non labored breathing  GI/ABD: soft, appropriately tender in LLQ, non-distended, no rebound or guarding  EXTREMITIES: warm, pink, well-perfused                                           10.1   7.77  )-----------( 279      ( 27 Nov 2017 06:15 )             32.3     11-28    144  |  106  |  7   ----------------------------<  123<H>  3.8   |  23  |  0.80    Ca    8.7      28 Nov 2017 05:35  Phos  2.7     11-28  Mg     1.8     11-28              ASSESSMENT: 62yFemale with acute sigmoid diverticulitis no abscess    PLAN:    - Diet: advance to regular  - Continue Zosyn, d/c with PO medication  - Monitor GI function  - Activity: OOB/ambulation  - Incentive spirometry  - DVT prophylaxis  - D/c home today if tolerating reg diet    Keely Guzman, PGY1  [id number]

## 2017-11-28 NOTE — DISCHARGE NOTE ADULT - CARE PLAN
Principal Discharge DX:	Diverticulitis of large intestine without perforation or abscess without bleeding  Goal:	Recovery from diverticulitis flare  Instructions for follow-up, activity and diet:	ACTIVITY: No heavy lifting or straining. Otherwise, you may return to your usual level of physical activity.   DIET: You may resume your regular diet.  NOTIFY YOUR SURGEON IF: You have a fever (over 100.4 F) or chills, persistent nausea/vomiting, persistent diarrhea, or if your pain is not controlled on your discharge pain medications.  FOLLOW-UP: Please follow up with your primary care physician in week regarding your hospitalization.

## 2017-11-28 NOTE — DISCHARGE NOTE ADULT - HOSPITAL COURSE
Patient is a 62y old Female who presents with a chief complaint of lower abdominal pain.  Patient states that since 11/22/17, she had severe lower abdominal pain, that was worse when she moved.  She had mild abdominal pain intermittently for the past 2 years, but this current episode was much more severe and it felt slightly deeper than her chronic pain.  Her pain was worst on the left lower abdomen.  She was initially able to cook on Thursday but did not eat anything, she had very limited PO intake since 11/22/17.  She has never had previous episodes of pain like this.  On admission, her pain was tolerable when she lays down and doesn't move.  She had chills when her symptoms started on 11/22/17, but denied fevers.  She denied lightheadedness/dizziness, denied SOB/chest pain, denied nausea/vomiting, denied constipation/diarrhea.  On admission, her last BM was 11/24/17.      She was made NPO, given IVF, and IV pain medications.  On 11/27/17, she was advanced to a CLD, as she was passing gas and her pain was minimal.  Her diet was advanced to a low fiber, regular diet, on 11/28/17 which she is tolerating well.  She is currently passing flatus and having normal BMs.  Her vitals and labs are within normal limits, her pain is minimal and controlled on oral medications, and she is stable for discharge.

## 2017-11-28 NOTE — DISCHARGE NOTE ADULT - NS AS ACTIVITY OBS
Walking-Indoors allowed/Return to Work/School allowed/Showering allowed/Stairs allowed/No Heavy lifting/straining/Walking-Outdoors allowed

## 2017-11-28 NOTE — PROGRESS NOTE ADULT - ATTENDING COMMENTS
I have personally interviewed and examined this patient, reviewed pertinent labs and imaging, and discussed the case with colleagues, residents, and physician assistants on B Team rounds.    The active care issues are:  1. acute diverticulitis    Plan  regular diet  home with po antibiotics  follow up with GI for colonoscopy in 6-8weeks

## 2017-11-28 NOTE — DISCHARGE NOTE ADULT - CARE PROVIDER_API CALL
Wai Azul), Surgery; Surgical Critical Care  [address] [place] [address]  Phone: [phone]  Fax: [phone]

## 2017-11-28 NOTE — DISCHARGE NOTE ADULT - PATIENT PORTAL LINK FT
“You can access the Follow[employer] Patient Portal, offered by [facility], by registering with the following website: [email or web address]”

## 2017-12-19 ENCOUNTER — APPOINTMENT (OUTPATIENT)
Dept: TRAUMA SURGERY | Facility: CLINIC | Age: 62
End: 2017-12-19
Payer: COMMERCIAL

## 2017-12-19 VITALS
DIASTOLIC BLOOD PRESSURE: 89 MMHG | HEART RATE: 92 BPM | TEMPERATURE: 98 F | SYSTOLIC BLOOD PRESSURE: 152 MMHG | BODY MASS INDEX: 42.95 KG/M2 | WEIGHT: 290 LBS | HEIGHT: 69 IN

## 2017-12-19 PROCEDURE — XXXXX: CPT

## 2019-05-01 NOTE — PATIENT PROFILE ADULT. - FUNCTIONAL SCREEN CURRENT LEVEL: TRANSFERRING, MLM
Left a VM for patient about her lab results. Mentioned to her to start some Iron.  
(2) assistive person

## 2019-11-19 NOTE — ED ADULT TRIAGE NOTE - PAIN RATING/NUMBER SCALE (0-10): REST
The patient was called and we discussed her lab results. We discussed that her cardiac lab was slightly elevated but that no further cardiac work up was needed. We would plan to monitor with another lab draw in the PACU. We discussed following up with her PCP within 5 days after surgery given her complex medical history. She prefers to call and make the appointment herself. Se had no further questions.   
7

## 2022-02-28 NOTE — PATIENT PROFILE ADULT. - NS PRO AD NO ADVANCE DIRECTIVE
Cough first then started with fever on Friday, today its a 103. Runny nose as well . No diarrhea or vomiting. Decreased appetite.    Yes